# Patient Record
Sex: FEMALE | NOT HISPANIC OR LATINO | URBAN - METROPOLITAN AREA
[De-identification: names, ages, dates, MRNs, and addresses within clinical notes are randomized per-mention and may not be internally consistent; named-entity substitution may affect disease eponyms.]

---

## 2021-03-03 ENCOUNTER — APPOINTMENT (OUTPATIENT)
Dept: VACCINE CLINIC | Facility: HOSPITAL | Age: 36
End: 2021-03-03

## 2025-05-12 RX ORDER — LABETALOL 100 MG/1
TABLET, FILM COATED ORAL EVERY 12 HOURS
COMMUNITY
Start: 2019-02-16

## 2025-05-12 RX ORDER — LORATADINE 10 MG/1
10 TABLET ORAL DAILY
COMMUNITY

## 2025-05-12 RX ORDER — HYDROXYZINE HYDROCHLORIDE 25 MG/1
25 TABLET, FILM COATED ORAL
COMMUNITY

## 2025-06-19 ENCOUNTER — OFFICE VISIT (OUTPATIENT)
Dept: FAMILY MEDICINE CLINIC | Facility: CLINIC | Age: 40
End: 2025-06-19
Payer: COMMERCIAL

## 2025-06-19 VITALS
WEIGHT: 293 LBS | SYSTOLIC BLOOD PRESSURE: 146 MMHG | RESPIRATION RATE: 20 BRPM | DIASTOLIC BLOOD PRESSURE: 82 MMHG | HEART RATE: 107 BPM | OXYGEN SATURATION: 98 % | HEIGHT: 66 IN | BODY MASS INDEX: 47.09 KG/M2

## 2025-06-19 DIAGNOSIS — I10 PRIMARY HYPERTENSION: ICD-10-CM

## 2025-06-19 DIAGNOSIS — E28.2 PCOS (POLYCYSTIC OVARIAN SYNDROME): ICD-10-CM

## 2025-06-19 DIAGNOSIS — G56.03 BILATERAL CARPAL TUNNEL SYNDROME: ICD-10-CM

## 2025-06-19 DIAGNOSIS — E66.01 CLASS 3 SEVERE OBESITY DUE TO EXCESS CALORIES WITH BODY MASS INDEX (BMI) OF 45.0 TO 49.9 IN ADULT, UNSPECIFIED WHETHER SERIOUS COMORBIDITY PRESENT: ICD-10-CM

## 2025-06-19 DIAGNOSIS — Z76.89 ENCOUNTER TO ESTABLISH CARE: Primary | ICD-10-CM

## 2025-06-19 DIAGNOSIS — E66.813 CLASS 3 SEVERE OBESITY DUE TO EXCESS CALORIES WITH BODY MASS INDEX (BMI) OF 45.0 TO 49.9 IN ADULT, UNSPECIFIED WHETHER SERIOUS COMORBIDITY PRESENT: ICD-10-CM

## 2025-06-19 RX ORDER — LISINOPRIL 5 MG/1
5 TABLET ORAL DAILY
COMMUNITY

## 2025-06-19 NOTE — PROGRESS NOTES
"Chief Complaint  Chief Complaint   Patient presents with    [facility]    Obesity     Discuss weight loss     Pain     Wears brace for wrist pain        Subjective        Genet Holley is a 39 y.o. female who presents to [facility].  History of Present Illness  Presents today to [facility] and has other concerns.    Medical conditions include:  Depression-Lexapro 5 mg QD  HTN-lisinopril 5 mg QD  Obesity and PCOS-semaglutide 0.25 mg QW    She has carpal tunnel for which she wears wrist braces at night and has attended physical therapy.  Unfortunately symptoms have not improved greatly, continues to have bilateral hand numbness/tingling.  Has been doing physical therapy for about 2 months without much improvement.    She has been on semaglutide injections weekly for weight loss through her previous primary care.  Was on this for 2 years, unfortunately PCP was not able to fill this through compounding pharmacy.  She has been off of it for a month and would like to restart.  Has gained about 10lbs over the last month.  Diet- good intake of fruits/vegetables, decent intake of fiber, but struggles with portion control  Exercise- water aerobics     Checks blood pressure at home, regularly about 110s/80s.  Just had diet coke and is nervous for appointment, believes this is why blood pressure is elevated.     Objective   /82 (BP Location: Left arm)   Pulse 107   Resp 20   Ht 167.6 cm (65.98\")   Wt 136 kg (300 lb)   SpO2 98%   BMI 48.44 kg/m²     Estimated body mass index is 48.44 kg/m² as calculated from the following:    Height as of this encounter: 167.6 cm (65.98\").    Weight as of this encounter: 136 kg (300 lb).     Physical Exam   GEN: In no acute distress, non toxic appearing  CV: Regular rate and rhythm, no murmurs, 2+ peripheral pulses, No extremity edema.   RESP: Lungs clear to auscultation anteriorly and posteriorly in all lung fields bilaterally.  MSK: Phalen's " test positive of right hand, Tinel test negative bilaterally  PSYCH: Affect normal, insight fair     PHQ-2 Depression Screening  Little interest or pleasure in doing things? Not at all   Feeling down, depressed, or hopeless? Not at all   PHQ-2 Total Score 0         Result Review :              Assessment and Plan     Assessment & Plan  Encounter to establish care         Primary hypertension  Continue lisinopril 5 mg  Encouraged monitoring at home, keep a log for review.       Bilateral carpal tunnel syndrome  Continue to wear wrist braces and physical therapy exercises at home.  As she has not had total resolution of her symptoms with conservative management, referral to [facility] has been made.  Orders:    Ambulatory Referral to Orthopedic Surgery    PCOS (polycystic ovarian syndrome)  Restart semaglutide.  She has not had improvement with metformin in the past.  Orders:    Semaglutide,0.25 or 0.5MG/DOS, (OZEMPIC) 2 MG/1.5ML solution pen-injector; Inject 0.25 mg under the skin into the appropriate area as directed 1 (One) Time Per Week. Compound semaglutide with vitamin B12 for nutritional support and energy enhancement    Class 3 severe obesity due to excess calories with body mass index (BMI) of 45.0 to 49.9 in adult, unspecified whether serious comorbidity present  Patient's (Body mass index is 48.44 kg/m².) indicates that they are obese (BMI >30) with health conditions that include hypertension . Weight is newly identified. BMI  is above average; BMI management plan is completed. We discussed portion control, increasing exercise, and pharmacologic options including semaglutide.   Discussed with compounded medication, cannot guarantee ingredients, patient understands.  Compounded medication agreement signed.  Denies personal or family history of thyroid cancer.  Understands should she have adverse side effects, she will discontinue medication and call our office.  Orders:    Semaglutide,0.25 or  0.5MG/DOS, (OZEMPIC) 2 MG/1.5ML solution pen-injector; Inject 0.25 mg under the skin into the appropriate area as directed 1 (One) Time Per Week. Compound semaglutide with vitamin B12 for nutritional support and energy enhancement    Follow-up in 5 months for annual physical and to ensure she is doing well with semaglutide.  She is in agreement with this plan and will call with any issues or concerns in the meantime.       Follow Up     Return in about 5 months (around 11/19/2025) for Annual physical.

## 2025-07-21 DIAGNOSIS — E28.2 PCOS (POLYCYSTIC OVARIAN SYNDROME): ICD-10-CM

## 2025-07-21 DIAGNOSIS — E66.813 CLASS 3 SEVERE OBESITY DUE TO EXCESS CALORIES WITH BODY MASS INDEX (BMI) OF 45.0 TO 49.9 IN ADULT, UNSPECIFIED WHETHER SERIOUS COMORBIDITY PRESENT: ICD-10-CM

## 2025-08-29 DIAGNOSIS — E66.813 CLASS 3 SEVERE OBESITY DUE TO EXCESS CALORIES WITH BODY MASS INDEX (BMI) OF 45.0 TO 49.9 IN ADULT, UNSPECIFIED WHETHER SERIOUS COMORBIDITY PRESENT: ICD-10-CM

## 2025-08-29 DIAGNOSIS — E28.2 PCOS (POLYCYSTIC OVARIAN SYNDROME): ICD-10-CM
